# Patient Record
Sex: FEMALE | Race: WHITE | URBAN - METROPOLITAN AREA
[De-identification: names, ages, dates, MRNs, and addresses within clinical notes are randomized per-mention and may not be internally consistent; named-entity substitution may affect disease eponyms.]

---

## 2020-02-20 ENCOUNTER — OFFICE VISIT (OUTPATIENT)
Dept: URBAN - METROPOLITAN AREA CLINIC 23 | Facility: CLINIC | Age: 25
End: 2020-02-20
Payer: COMMERCIAL

## 2020-02-20 DIAGNOSIS — R51 HEADACHE: Primary | ICD-10-CM

## 2020-02-20 PROCEDURE — 92004 COMPRE OPH EXAM NEW PT 1/>: CPT | Performed by: OPTOMETRIST

## 2020-02-20 ASSESSMENT — KERATOMETRY
OS: 42.38
OD: 42.13

## 2020-02-20 ASSESSMENT — INTRAOCULAR PRESSURE
OD: 14
OS: 15

## 2020-02-20 NOTE — IMPRESSION/PLAN
Impression: Headache: R51 OU. Plan: Discussed diagnosis in detail with patient. Discussed treatment options with patient. Reassured patient of current condition. Advise OTC readers for computer. If symptoms persist consult PCP for further testing.